# Patient Record
Sex: MALE | Race: WHITE | NOT HISPANIC OR LATINO | URBAN - METROPOLITAN AREA
[De-identification: names, ages, dates, MRNs, and addresses within clinical notes are randomized per-mention and may not be internally consistent; named-entity substitution may affect disease eponyms.]

---

## 2017-12-14 ENCOUNTER — HOSPITAL ENCOUNTER (EMERGENCY)
Facility: HOSPITAL | Age: 52
Discharge: HOME | End: 2017-12-14
Attending: EMERGENCY MEDICINE

## 2017-12-14 VITALS
TEMPERATURE: 98 F | SYSTOLIC BLOOD PRESSURE: 165 MMHG | DIASTOLIC BLOOD PRESSURE: 80 MMHG | WEIGHT: 275 LBS | RESPIRATION RATE: 16 BRPM | HEART RATE: 88 BPM | OXYGEN SATURATION: 98 %

## 2017-12-14 DIAGNOSIS — S59.919A FISH HOOK IN FOREARM: Primary | ICD-10-CM

## 2017-12-14 DIAGNOSIS — S00.259A FB (FOREIGN BODY) OF EYELID: ICD-10-CM

## 2017-12-14 PROCEDURE — 90471 IMMUNIZATION ADMIN: CPT | Performed by: NURSE PRACTITIONER

## 2017-12-14 PROCEDURE — 25000003 PHARM REV CODE 250: Performed by: EMERGENCY MEDICINE

## 2017-12-14 PROCEDURE — 90715 TDAP VACCINE 7 YRS/> IM: CPT | Performed by: NURSE PRACTITIONER

## 2017-12-14 PROCEDURE — 63600175 PHARM REV CODE 636 W HCPCS: Performed by: NURSE PRACTITIONER

## 2017-12-14 PROCEDURE — 99283 EMERGENCY DEPT VISIT LOW MDM: CPT | Mod: 25

## 2017-12-14 PROCEDURE — 25000003 PHARM REV CODE 250: Performed by: NURSE PRACTITIONER

## 2017-12-14 PROCEDURE — S0020 INJECTION, BUPIVICAINE HYDRO: HCPCS | Performed by: EMERGENCY MEDICINE

## 2017-12-14 PROCEDURE — 10120 INC&RMVL FB SUBQ TISS SMPL: CPT

## 2017-12-14 RX ORDER — BUPIVACAINE HYDROCHLORIDE 5 MG/ML
10 INJECTION, SOLUTION EPIDURAL; INTRACAUDAL ONCE
Status: COMPLETED | OUTPATIENT
Start: 2017-12-14 | End: 2017-12-14

## 2017-12-14 RX ORDER — LIDOCAINE HYDROCHLORIDE 20 MG/ML
10 INJECTION, SOLUTION EPIDURAL; INFILTRATION; INTRACAUDAL; PERINEURAL
Status: COMPLETED | OUTPATIENT
Start: 2017-12-14 | End: 2017-12-14

## 2017-12-14 RX ORDER — CIPROFLOXACIN 500 MG/1
500 TABLET ORAL
Status: COMPLETED | OUTPATIENT
Start: 2017-12-14 | End: 2017-12-14

## 2017-12-14 RX ORDER — ALLOPURINOL 100 MG/1
100 TABLET ORAL DAILY
COMMUNITY

## 2017-12-14 RX ORDER — BUPIVACAINE HYDROCHLORIDE 5 MG/ML
10 INJECTION, SOLUTION EPIDURAL; INTRACAUDAL ONCE
Status: DISCONTINUED | OUTPATIENT
Start: 2017-12-14 | End: 2017-12-14

## 2017-12-14 RX ORDER — CIPROFLOXACIN 500 MG/1
500 TABLET ORAL 2 TIMES DAILY
Qty: 20 TABLET | Refills: 0 | Status: SHIPPED | OUTPATIENT
Start: 2017-12-14 | End: 2017-12-24

## 2017-12-14 RX ORDER — CEPHALEXIN 500 MG/1
500 CAPSULE ORAL 4 TIMES DAILY
Qty: 20 CAPSULE | Refills: 0 | Status: SHIPPED | OUTPATIENT
Start: 2017-12-14 | End: 2017-12-19

## 2017-12-14 RX ORDER — CEPHALEXIN 250 MG/1
500 CAPSULE ORAL
Status: COMPLETED | OUTPATIENT
Start: 2017-12-14 | End: 2017-12-14

## 2017-12-14 RX ORDER — MUPIROCIN 20 MG/G
1 OINTMENT TOPICAL
Status: COMPLETED | OUTPATIENT
Start: 2017-12-14 | End: 2017-12-14

## 2017-12-14 RX ADMIN — LIDOCAINE HYDROCHLORIDE 200 MG: 20 INJECTION, SOLUTION EPIDURAL; INFILTRATION; INTRACAUDAL; PERINEURAL at 05:12

## 2017-12-14 RX ADMIN — MUPIROCIN 22 G: 20 OINTMENT TOPICAL at 06:12

## 2017-12-14 RX ADMIN — CIPROFLOXACIN 500 MG: 500 TABLET, FILM COATED ORAL at 06:12

## 2017-12-14 RX ADMIN — CLOSTRIDIUM TETANI TOXOID ANTIGEN (FORMALDEHYDE INACTIVATED), CORYNEBACTERIUM DIPHTHERIAE TOXOID ANTIGEN (FORMALDEHYDE INACTIVATED), BORDETELLA PERTUSSIS TOXOID ANTIGEN (GLUTARALDEHYDE INACTIVATED), BORDETELLA PERTUSSIS FILAMENTOUS HEMAGGLUTININ ANTIGEN (FORMALDEHYDE INACTIVATED), BORDETELLA PERTUSSIS PERTACTIN ANTIGEN, AND BORDETELLA PERTUSSIS FIMBRIAE 2/3 ANTIGEN 0.5 ML: 5; 2; 2.5; 5; 3; 5 INJECTION, SUSPENSION INTRAMUSCULAR at 05:12

## 2017-12-14 RX ADMIN — BUPIVACAINE HYDROCHLORIDE 50 MG: 5 INJECTION, SOLUTION EPIDURAL; INTRACAUDAL; PERINEURAL at 05:12

## 2017-12-14 RX ADMIN — CEPHALEXIN 500 MG: 250 CAPSULE ORAL at 06:12

## 2017-12-14 NOTE — ED NOTES
Pt states he was putting his fishing poles up when something fell from over head causing his pole to flex and flinging  the fishing lure into his RFA. Bleeding is controlled. Friend at bedside. Abc's intact. ALEIDA.

## 2017-12-14 NOTE — ED PROVIDER NOTES
Encounter Date: 12/14/2017    SCRIBE #1 NOTE: I, Marivel Hare, am scribing for, and in the presence of, SREE Pepe.       History     Chief Complaint   Patient presents with    Foreign Body in Skin     RIGHT forearm fishing lure       12/14/2017 4:55 PM     Chief complaint: Foreign Body in Skin of Right Forearm      Moreno Crowley is a 52 y.o. male with history of HTN, gout who presents to the ED with complaint of the hook of a fishing lure in the skin of his right forearm. The patient states that 30-45 minutes ago, he was putting poles away in the garage when something fell causing the hook to become lodged into his arm. He states that it is a single hook with a samantha. He describes pain with movement of his finger and rotation of the arm. He takes allopurinol and lisinopril daily. No known drug allergies. He is unsure his last Tetanus shot.       The history is provided by the patient.     Review of patient's allergies indicates:  No Known Allergies  Past Medical History:   Diagnosis Date    Gout     Hypertension      History reviewed. No pertinent surgical history.  History reviewed. No pertinent family history.  Social History   Substance Use Topics    Smoking status: Never Smoker    Smokeless tobacco: Never Used    Alcohol use Yes     Review of Systems   Constitutional: Negative for chills and fever.   HENT: Negative for congestion, rhinorrhea and sore throat.    Eyes: Negative for pain and redness.   Respiratory: Negative for cough and shortness of breath.    Cardiovascular: Negative for chest pain and palpitations.   Gastrointestinal: Negative for abdominal pain, diarrhea and nausea.   Genitourinary: Negative for dysuria, flank pain, frequency, hematuria and urgency.   Musculoskeletal: Positive for myalgias. Negative for gait problem and neck pain.   Skin: Positive for wound.        +Foregin body in right forearm   Neurological: Negative for dizziness, light-headedness and headaches.        Physical Exam     Initial Vitals [12/14/17 1643]   BP Pulse Resp Temp SpO2   (!) 165/80 88 16 98.2 °F (36.8 °C) 98 %      MAP       108.33         Physical Exam    Nursing note and vitals reviewed.  Constitutional: Vital signs are normal. He appears well-developed and well-nourished.   HENT:   Head: Normocephalic and atraumatic.   Eyes: Pupils are equal, round, and reactive to light.   Neck: Neck supple.   Cardiovascular: Normal rate, regular rhythm, normal heart sounds and intact distal pulses.   No murmur heard.  Pulmonary/Chest: Breath sounds normal.   Abdominal: Normal appearance.   Musculoskeletal:        Right wrist: He exhibits decreased range of motion and tenderness. He exhibits no bony tenderness, no swelling, no effusion, no crepitus, no deformity and no laceration.        Arms:  Neurological: He is alert and oriented to person, place, and time. He has normal strength.   Skin: Skin is warm, dry and intact. Capillary refill takes less than 2 seconds.        Psychiatric: He has a normal mood and affect. His speech is normal and behavior is normal.         ED Course   Foreign Body removal   Date/Time: 12/14/2017 7:41 PM  Performed by: SALVATORE KIRKLAND  Authorized by: MOSES MICHAUD   Consent Done: Yes  Consent: Verbal consent obtained.  Risks and benefits: risks, benefits and alternatives were discussed  Consent given by: patient  Patient understanding: patient states understanding of the procedure being performed  Patient consent: the patient's understanding of the procedure matches consent given  Procedure consent: procedure consent matches procedure scheduled  Body area: skin  General location: upper extremity  Location details: right wrist  Anesthesia: local infiltration    Anesthesia:  Local Anesthetic: bupivacaine 0.5% without epinephrine and lidocaine 2% without epinephrine  Anesthetic total: 10 mL  Patient sedated: no  Patient restrained: no  Localization method: xray.  Removal mechanism:  scalpel and hemostat  Dressing: dressing applied and antibiotic ointment  Tendon involvement: none  Depth: subcutaneous  Complexity: simple  1 objects recovered.  Objects recovered: fish hook  Post-procedure assessment: foreign body removed  Patient tolerance: Patient tolerated the procedure well with no immediate complications  Comments: 1 4.0 prolene suture placed right wrist after incision made to remove fish hook  Hook advanced through skin, scalpel used to incise over point of hook, metal nippers used to cut off hook then shank backed out through entry wound      Labs Reviewed - No data to display          Medical Decision Making:   History:   Old Medical Records: I decided to obtain old medical records.  Differential Diagnosis:   FB  Cellulitis  Vascular injury   Clinical Tests:   Radiological Study: Ordered and Reviewed       APC / Resident Notes:   Patient is a 52 y.o. male who presents to the ED 12/14/2017 who underwent emergent evaluation for fishhook in right wrist ulnar region occurred immediately prior to arrival.  Tetanus immunization updated today.  Her local infiltration with lidocaine and bupivacaine; fishhook removed as noted above per Dr. Garcia.  Patient tolerated well.  One suture placed at incision site to remove fishhook.  Patient given topical and by mouth antibiotics.  Follow-up and return precautions discussed; no current signs of infection or vascular compromise.  He is discharged in stable condition.            Scribe Attestation:   Scribe #1: I performed the above scribed service and the documentation accurately describes the services I performed. I attest to the accuracy of the note.    Attending Attestation:     Physician Attestation Statement for NP/PA:   I have conducted a face to face encounter with this patient in addition to the NP/PA, due to Medical Complexity    Other NP/PA Attestation Additions:      Medical Decision Making: I provided a face to face evaluation of this  patient.  I discussed the patient's care with Advanced Practice Clinician.  I reviewed their note and agree with the history, physical, assessment, diagnosis, treatment, and discharge plan provided by the Advanced Practice Clinician. My overall impression is foreign body removal, fishing hook.  The patient has been instructed to follow up with their physician or the one provided as well as specific return precautions. Pulses checked prior to DC, bounding radial and ulnar pulse. No evidence retained FB or arterial injury. Tetanus shot in ER, single stitch placed at site of entry wound, DC on abx.         Physician Attestation for Scribe:  Physician Attestation Statement for Scribe #1: I, Alexia Montiel, reviewed documentation, as scribed by in my presence, and it is both accurate and complete.     Comments: I, JENNIFER PepeC, personally performed the services described in this documentation. All medical record entries made by the scribe were at my direction and in my presence.  I have reviewed the chart and agree that the record reflects my personal performance and is accurate and complete. SREE Pepe.  7:49 PM 12/14/2017             ED Course      Clinical Impression:   The primary encounter diagnosis was Fish hook in forearm. A diagnosis of FB (foreign body) of eyelid was also pertinent to this visit.    Disposition:   Disposition: Discharged  Condition: Stable                        Alexia Montiel NP  12/14/17 1949       Aidan Garcia MD  12/15/17 8450